# Patient Record
(demographics unavailable — no encounter records)

---

## 2017-01-24 NOTE — EMERGENCY DEPARTMENT REPORT
Chief Complaint: Dyspnea/Respdistress


Stated Complaint: TONI/CHEST PAIN


Time Seen by Provider: 01/24/17 16:29





- HPI


History of Present Illness: 





15-year-old female comes in for shortness of breathing that's been going on for 

1-1/2 weeks.  She reports that today she had increased shortness of breath 

dizziness while sitting in class today.  She denies any chest pain at this 

moment.  She was currently taking Keflex for sore throat infection.  Denies any 

dizziness no nausea no vomiting.  No fever no chills.  She reports she was 

recently diagnosed with anxiety currently taking no meds for that.





- Exam


Vital Signs: 


 Vital Signs











  01/24/17





  15:57


 


Temperature 98.3 F


 


Pulse Rate 96


 


Blood Pressure 128/84


 


O2 Sat by Pulse 18 L





Oximetry 











Physical Exam: 





Patient is alert and oriented 3 cardiovascular S1-S2 regular rate and rhythm 

respiratory clear to his bilateral


MSE screening note: 


Focused history and physical exam performed.


Due to findings the following was ordered:





Patient evaluated with his provider should be seen in the main ER.





ED Disposition for MSE


Condition: Stable

## 2017-01-25 NOTE — EMERGENCY DEPARTMENT REPORT
[id number]/24/17 16:29





- HPI


HPI: 


This is a 15-year-old female presents the emergency department with her father 

with complaint of a 1.5 week history of some shortness of breath and tightness 

in the chest.  She denies any active chest pain but does describe this 

squeezing or tightness in relation to her respirations.  She denies any fever, 

nausea, vomiting, back pain, diaphoresis, palpitations, edema.  She is not 

taken anything for symptoms prior to presentation.  She does have a primary 

care doctor but has not seen them regarding these current symptoms.  She has a 

remote history of asthma but does not have any inhalers or breathing machine at 

home.  She has not taken anything for symptoms prior to presentation.  She 

denies any tobacco use or illicit drug use or abuse.








ED Past Medical Hx





- Past Medical History


Hx Asthma: Yes





- Surgical History


Additional Surgical History: BILATERAL EYE SURGERY





- Social History


Smoking Status: Never Smoker


Substance Use Type: None





- Medications


Home Medications: 


 Home Medications











 Medication  Instructions  Recorded  Confirmed  Last Taken  Type


 


ALBUTEROL Inhaler [ProAir HFA 2 puff IH QID PRN #1 inhalation 01/25/17  Unknown 

Rx





Inhaler]     














ED Review of Systems


ROS: 


Stated complaint: TONI/CHEST PAIN


Other details as noted in HPI





Comment: All other systems reviewed and negative


Constitutional: denies: chills, fever


Eyes: denies: eye pain, eye discharge, vision change


ENT: denies: ear pain, throat pain


Respiratory: shortness of breath.  denies: cough


Cardiovascular: denies: palpitations, edema


Gastrointestinal: denies: abdominal pain, nausea, diarrhea


Genitourinary: denies: urgency, dysuria, discharge


Musculoskeletal: denies: back pain, joint swelling, arthralgia


Skin: denies: rash, lesions


Neurological: denies: headache, weakness, paresthesias





Physical Exam





- Physical Exam


Vital Signs: 


 Vital Signs











  01/24/17 01/24/17 01/24/17





  15:57 17:30 23:59


 


Temperature 98.3 F  97.9 F


 


Pulse Rate 96  82


 


Respiratory  16 18





Rate   


 


Blood Pressure 128/84  


 


Blood Pressure   135/84





[Left]   


 


O2 Sat by Pulse 18 L  100





Oximetry   











Physical Exam: 


GENERAL: The patient is well-developed well-nourished.


HEENT: Normocephalic.  Atraumatic.  Extraocular motions are intact.  Patient 

has moist mucous membranes.  Pupils equal reactive to light bilaterally.


NECK: Supple.  Trachea is midline.


CHEST/LUNGS: Mild expiratory wheezing.  No cough heard during examination.  No 

tachypnea or accessory muscle use.  There is no respiratory distress noted.


HEART/CARDIOVASCULAR: Regular.  There is no tachycardia.  There is no gallop 

rub or murmur.


ABDOMEN: Abdomen is soft, nontender.  Patient has normal bowel sounds.  There 

is no abdominal distention.


SKIN: There is no rash.  There is no edema.  There is no diaphoresis.


NEURO: The patient is awake, alert, and oriented.  The patient is cooperative.  

The patient has no focal neurologic deficits.  The patient has normal speech.


MUSCULOSKELETAL: There is no tenderness or deformity.  There is no limitation 

range of motion.  There is no evidence of acute injury.








ED Course


 Vital Signs











  01/24/17 01/24/17 01/24/17





  15:57 17:30 23:59


 


Temperature 98.3 F  97.9 F


 


Pulse Rate 96  82


 


Respiratory  16 18





Rate   


 


Blood Pressure 128/84  


 


Blood Pressure   135/84





[Left]   


 


O2 Sat by Pulse 18 L  100





Oximetry   














ED Medical Decision Making





- EKG Data


-: EKG Interpreted by Me


EKG shows normal: sinus rhythm, axis, intervals, QRS complexes, ST-T waves


Rate: normal





- EKG Data


When compared to previous EKG there are: previous EKG unavailable


Interpretation: normal EKG





- Radiology Data


Radiology results: image reviewed


interpreted by me: 


Chest x-ray did not show any acute process.  Heart is normal shape and size.  

No effusions.  No pneumothorax.  No signs of pneumonia seen.








- Medical Decision Making


15-year-old female presents with 1.5 week history of some shortness of breath 

and chest pain.  EKG is normal without ST elevation MI ischemia or dysrhythmia.

  Chest x-ray does not show any acute process.  Patient was given a DuoNeb 

treatment and upon reevaluation she says she is feeling much better.  Given 1 

dose of Decadron and will be discharged home with an albuterol inhaler.  

Patient to follow-up with her PCP in 1-2 days and will return to the ER with 

any worsening of her symptoms or any acute distress.








- Differential Diagnosis


bronchospasm, asthma, pneumonia, bronchitis


Critical Care Time: No


Critical care attestation.: 


If time is entered above; I have spent that time in minutes in the direct care 

of this critically ill patient, excluding procedure time.








ED Disposition


Clinical Impression: 


 Bronchospasm


Disposition: DISCHARGED TO HOME OR SELFCARE


Is pt being admited?: No


Does the pt Need Aspirin: No


Condition: Good


Instructions:  Bronchospasm (ED)


Additional Instructions: 


Please follow-up with your primary care doctor in the next few days.  Return to 

the emergency department with any worsening of your symptoms or any acute 

distress.


Prescriptions: 


ALBUTEROL Inhaler [ProAir HFA Inhaler] 2 puff IH QID PRN #1 inhalation


 PRN Reason: Shortness Of Breath


Referrals: 


PRIMARY CARE,MD [Primary Care Provider] - 3-5 Days


Time of Disposition: 02:41

## 2017-01-25 NOTE — XRAY REPORT
ROUTINE CHEST, TWO VIEWS:



HISTORY:  Shortness of breath.



The trachea, heart, mediastinal contour, lung fields and bony thorax 

are unremarkable.



IMPRESSION:

Unremarkable chest x-ray.

## 2020-06-12 NOTE — EMERGENCY DEPARTMENT REPORT
ED ENT HPI





- General


Chief complaint: Sore Throat


Stated complaint: THROAT PAIN,FEVER


Time Seen by Provider: 20 09:52


Source: patient


Mode of arrival: Ambulatory


Limitations: No Limitations





- History of Present Illness


Initial comments: 





19-year-old  female presents to the emergency room for sore throat x2 

days.  Patient states that she went to urgent care but they sent her to the 

hospital because she had a fever.  Patient reports that her temperature at the 

clinic was 99.6.  Patient reports she has not taken any Tylenol or Motrin.  Her 

last menstrual period was 2020.  She is  0 para 0.  She denies any 

nausea vomiting headache or abdominal pain.


MD complaint: sore throat, difficulty swallowing


Onset/Timin


-: days(s)


Location: throat


Severity scale (0 -10): 5


Quality: sharp


Consistency: constant


Improves with: none


Worsens with: swallowing


Associated Symptoms: pain with swallowing, sore throat





- Related Data


                                  Previous Rx's











 Medication  Instructions  Recorded  Last Taken  Type


 


Albuterol INH(or & Nicu Only) 2 puff IH QID PRN #1 inhalation 17 Unknown 

Rx





[ProAir HFA Inhaler]    


 


Amoxicillin [Amoxicillin TAB] 875 mg PO BID 7 Days #14 tablet 20 Unknown 

Rx











                                    Allergies











Allergy/AdvReac Type Severity Reaction Status Date / Time


 


No Known Allergies Allergy   Verified 20 09:39














ED Dental HPI





- General


Chief complaint: Sore Throat


Stated complaint: THROAT PAIN,FEVER


Time Seen by Provider: 20 09:52


Source: patient


Mode of arrival: Ambulatory


Limitations: No Limitations





- Related Data


                                  Previous Rx's











 Medication  Instructions  Recorded  Last Taken  Type


 


Albuterol INH(or & Nicu Only) 2 puff IH QID PRN #1 inhalation 17 Unknown 

Rx





[ProAir HFA Inhaler]    


 


Amoxicillin [Amoxicillin TAB] 875 mg PO BID 7 Days #14 tablet 20 Unknown 

Rx











                                    Allergies











Allergy/AdvReac Type Severity Reaction Status Date / Time


 


No Known Allergies Allergy   Verified 20 09:39














ED Review of Systems


ROS: 


Stated complaint: THROAT PAIN,FEVER


Other details as noted in HPI





Comment: All other systems reviewed and negative





ED Past Medical Hx





- Past Medical History


Hx Asthma: Yes





- Surgical History


Additional Surgical History: BILATERAL EYE SURGERY





- Social History


Smoking Status: Never Smoker


Substance Use Type: None





- Medications


Home Medications: 


                                Home Medications











 Medication  Instructions  Recorded  Confirmed  Last Taken  Type


 


Albuterol INH(or & Nicu Only) 2 puff IH QID PRN #1 inhalation 17  Unknown 

Rx





[ProAir HFA Inhaler]     


 


Amoxicillin [Amoxicillin TAB] 875 mg PO BID 7 Days #14 tablet 20  Unknown 

Rx














ED Physical Exam





- General


Limitations: No Limitations


General appearance: alert, in no apparent distress





- Head


Head exam: Present: atraumatic, normocephalic





- Eye


Eye exam: Present: normal appearance





- Expanded ENT Exam


  ** Expanded


Throat exam: Positive: tonsillar erythema, tonsillomegaly, tonsillar exudate





- Neck


Neck exam: Present: lymphadenopathy





- Respiratory


Respiratory exam: Present: normal lung sounds bilaterally.  Absent: respiratory 

distress





- Cardiovascular


Cardiovascular Exam: Present: tachycardia





- Neurological Exam


Neurological exam: Present: alert, oriented X3





- Psychiatric


Psychiatric exam: Present: normal affect, normal mood





- Skin


Skin exam: Present: warm, dry, intact, normal color.  Absent: rash





ED Course





                                   Vital Signs











  20





  09:39 09:45


 


Temperature 98.6 F 98.6 F


 


Pulse Rate 120 H 129 H


 


Respiratory  18





Rate  


 


Blood Pressure  125/78


 


O2 Sat by Pulse  98





Oximetry  














ED Medical Decision Making





- Medical Decision Making





19-year-old  female presents to the emergency room for sore throat x2 

days.  Patient states that she went to urgent care but they sent her to the 

hospital because she had a fever.  Patient reports that her temperature at the 

clinic was 99.6.  Patient reports she has not taken any Tylenol or Motrin.  Her 

last menstrual period was 2020.  She is  0 para 0.  She denies any 

nausea vomiting headache or abdominal pain.








Pulse was rechecked by this provider results were 106.











Patient be treated for strep throat placed on amoxicillin 875 mg p.o. twice 

daily for 7 days she can take ibuprofen or Tylenol for pain management.


Critical care attestation.: 


If time is entered above; I have spent that time in minutes in the direct care 

of this critically ill patient, excluding procedure time.








ED Disposition


Clinical Impression: 


 Strep throat





Disposition: DC-01 TO HOME OR SELFCARE


Is pt being admited?: No


Does the pt Need Aspirin: No


Condition: Stable


Instructions:  Strep Throat (ED)


Additional Instructions: 


Complete antibiotics as prescribed.  Increase your fluid intake.  Take Tylenol 

or ibuprofen as needed for pain management.


Prescriptions: 


Amoxicillin [Amoxicillin TAB] 875 mg PO BID 7 Days #14 tablet


Referrals: 


PRIMARY CARE,MD [Primary Care Provider] - 3-5 Days


[facility] [Provider Group] - 3-5 Days


Forms:  Work/School Release Form(ED)